# Patient Record
Sex: FEMALE | Race: WHITE
[De-identification: names, ages, dates, MRNs, and addresses within clinical notes are randomized per-mention and may not be internally consistent; named-entity substitution may affect disease eponyms.]

---

## 2018-01-14 ENCOUNTER — HOSPITAL ENCOUNTER (EMERGENCY)
Dept: HOSPITAL 17 - NEPC | Age: 83
Discharge: HOME | End: 2018-01-14
Payer: COMMERCIAL

## 2018-01-14 VITALS
HEART RATE: 86 BPM | OXYGEN SATURATION: 95 % | SYSTOLIC BLOOD PRESSURE: 102 MMHG | RESPIRATION RATE: 14 BRPM | DIASTOLIC BLOOD PRESSURE: 55 MMHG

## 2018-01-14 VITALS — OXYGEN SATURATION: 97 % | RESPIRATION RATE: 18 BRPM

## 2018-01-14 VITALS
SYSTOLIC BLOOD PRESSURE: 91 MMHG | DIASTOLIC BLOOD PRESSURE: 55 MMHG | HEART RATE: 103 BPM | OXYGEN SATURATION: 97 % | RESPIRATION RATE: 18 BRPM | TEMPERATURE: 98.6 F

## 2018-01-14 VITALS — BODY MASS INDEX: 23.01 KG/M2 | HEIGHT: 61.5 IN | WEIGHT: 123.46 LBS

## 2018-01-14 VITALS
HEART RATE: 88 BPM | RESPIRATION RATE: 18 BRPM | OXYGEN SATURATION: 94 % | DIASTOLIC BLOOD PRESSURE: 56 MMHG | SYSTOLIC BLOOD PRESSURE: 112 MMHG

## 2018-01-14 DIAGNOSIS — Z79.82: ICD-10-CM

## 2018-01-14 DIAGNOSIS — E78.00: ICD-10-CM

## 2018-01-14 DIAGNOSIS — T88.7XXA: Primary | ICD-10-CM

## 2018-01-14 DIAGNOSIS — R63.1: ICD-10-CM

## 2018-01-14 DIAGNOSIS — R53.1: ICD-10-CM

## 2018-01-14 DIAGNOSIS — E07.9: ICD-10-CM

## 2018-01-14 DIAGNOSIS — R94.31: ICD-10-CM

## 2018-01-14 DIAGNOSIS — Z87.19: ICD-10-CM

## 2018-01-14 DIAGNOSIS — K57.90: ICD-10-CM

## 2018-01-14 LAB
ALBUMIN SERPL-MCNC: 3.9 GM/DL (ref 3.4–5)
ALP SERPL-CCNC: 69 U/L (ref 45–117)
ALT SERPL-CCNC: 30 U/L (ref 10–53)
AST SERPL-CCNC: 30 U/L (ref 15–37)
BASOPHILS # BLD AUTO: 0 TH/MM3 (ref 0–0.2)
BASOPHILS NFR BLD: 0.2 % (ref 0–2)
BILIRUB SERPL-MCNC: 0.6 MG/DL (ref 0.2–1)
BUN SERPL-MCNC: 15 MG/DL (ref 7–18)
CALCIUM SERPL-MCNC: 8.7 MG/DL (ref 8.5–10.1)
CHLORIDE SERPL-SCNC: 105 MEQ/L (ref 98–107)
COLOR UR: (no result)
CREAT SERPL-MCNC: 0.82 MG/DL (ref 0.5–1)
EOSINOPHIL # BLD: 0 TH/MM3 (ref 0–0.4)
EOSINOPHIL NFR BLD: 0 % (ref 0–4)
ERYTHROCYTE [DISTWIDTH] IN BLOOD BY AUTOMATED COUNT: 13.3 % (ref 11.6–17.2)
GFR SERPLBLD BASED ON 1.73 SQ M-ARVRAT: 67 ML/MIN (ref 89–?)
GLUCOSE SERPL-MCNC: 139 MG/DL (ref 74–106)
GLUCOSE UR STRIP-MCNC: (no result) MG/DL
HCO3 BLD-SCNC: 25 MEQ/L (ref 21–32)
HCT VFR BLD CALC: 42.3 % (ref 35–46)
HGB BLD-MCNC: 14.5 GM/DL (ref 11.6–15.3)
HGB UR QL STRIP: (no result)
KETONES UR STRIP-MCNC: (no result) MG/DL
LIPASE: 144 U/L (ref 73–393)
LYMPHOCYTES # BLD AUTO: 0.2 TH/MM3 (ref 1–4.8)
LYMPHOCYTES NFR BLD AUTO: 1.3 % (ref 9–44)
MCH RBC QN AUTO: 31.3 PG (ref 27–34)
MCHC RBC AUTO-ENTMCNC: 34.2 % (ref 32–36)
MCV RBC AUTO: 91.6 FL (ref 80–100)
MONOCYTE #: 0.2 TH/MM3 (ref 0–0.9)
MONOCYTES NFR BLD: 1.7 % (ref 0–8)
NEUTROPHILS # BLD AUTO: 12.3 TH/MM3 (ref 1.8–7.7)
NEUTROPHILS NFR BLD AUTO: 96.8 % (ref 16–70)
NITRITE UR QL STRIP: (no result)
PLATELET # BLD: 202 TH/MM3 (ref 150–450)
PMV BLD AUTO: 7.1 FL (ref 7–11)
PROT SERPL-MCNC: 7.6 GM/DL (ref 6.4–8.2)
RBC # BLD AUTO: 4.61 MIL/MM3 (ref 4–5.3)
SODIUM SERPL-SCNC: 138 MEQ/L (ref 136–145)
SP GR UR STRIP: 1.02 (ref 1–1.03)
SQUAMOUS #/AREA URNS HPF: <1 /HPF (ref 0–5)
URINE LEUKOCYTE ESTERASE: (no result)
WBC # BLD AUTO: 12.7 TH/MM3 (ref 4–11)

## 2018-01-14 PROCEDURE — 96361 HYDRATE IV INFUSION ADD-ON: CPT

## 2018-01-14 PROCEDURE — 96360 HYDRATION IV INFUSION INIT: CPT

## 2018-01-14 PROCEDURE — 83690 ASSAY OF LIPASE: CPT

## 2018-01-14 PROCEDURE — 74177 CT ABD & PELVIS W/CONTRAST: CPT

## 2018-01-14 PROCEDURE — 85025 COMPLETE CBC W/AUTO DIFF WBC: CPT

## 2018-01-14 PROCEDURE — 80053 COMPREHEN METABOLIC PANEL: CPT

## 2018-01-14 PROCEDURE — 93005 ELECTROCARDIOGRAM TRACING: CPT

## 2018-01-14 PROCEDURE — 99285 EMERGENCY DEPT VISIT HI MDM: CPT

## 2018-01-14 PROCEDURE — 81001 URINALYSIS AUTO W/SCOPE: CPT

## 2018-01-14 NOTE — PD
HPI


Chief Complaint:  Medical Clearance


Time Seen by Provider:  11:24


Travel History


International Travel<30 days:  No


Contact w/Intl Traveler<30days:  No


Traveled to known affect area:  No





History of Present Illness


HPI


82-year-old female arrives with a history of tremor and polyuria.  Evidently 

she had episodes of diarrhea 2 days ago which was profuse.  She is recently 

started medication, Linzess.  The patient also complains of generalized fatigue 

and excessive sleepiness. Severity moderate. no modifying factor.





PFSH


Past Medical History


High Cholesterol:  Yes


Gastrointestinal Disorders:  Yes (ibs)


Thyroid Disease:  Yes


Tubal Ligation:  Yes





Past Surgical History


Other Surgery:  Yes (l elbow, l ankle)





Social History


Alcohol Use:  No


Tobacco Use:  No


Substance Use:  No





Allergies-Medications


(Allergen,Severity, Reaction):  


Coded Allergies:  


     No Known Allergies (Unverified , 1/14/18)


Reported Meds & Prescriptions





Reported Meds & Active Scripts


Active


Reported


Atorvastatin (Atorvastatin Calcium) 20 Mg Tab 20 Mg PO HS


Aspirin 325 Mg Tab 162 Mg PO DAILY


Linzess (Linaclotide) 290 Mcg Cap 290 Mcg PO DAILY








Review of Systems


Except as stated in HPI:  all other systems reviewed are Neg





Physical Exam


Narrative


GENERAL: Well-nourished well-developed 10-year-old female no acute distress


SKIN: Warm and dry.


HEAD: Atraumatic. Normocephalic. 


EYES: Pupils equal and round. No scleral icterus. No injection or drainage. 


ENT: No nasal bleeding or discharge.  Mucous membranes pink and moist.


NECK: Trachea midline. No JVD. 


CARDIOVASCULAR: Regular rate and rhythm.  


RESPIRATORY: No accessory muscle use. Clear to auscultation. Breath sounds 

equal bilaterally. 


GASTROINTESTINAL: Abdomen soft, non-tender, nondistended. Hepatic and splenic 

margins not palpable. 


MUSCULOSKELETAL: Extremities without clubbing, cyanosis, or edema. No obvious 

deformities. 


NEUROLOGICAL: Awake and alert. No obvious cranial nerve deficits.  Motor 

grossly within normal limits. Five out of 5 muscle strength in the arms and 

legs.  Normal speech.


PSYCHIATRIC: Appropriate mood and affect; insight and judgment normal.





Data


Data


Last Documented VS





Vital Signs








  Date Time  Temp Pulse Resp B/P (MAP) Pulse Ox O2 Delivery O2 Flow Rate FiO2


 


1/14/18 15:29  88 18 112/56 (74) 95 Room Air  


 


1/14/18 09:56 98.6       








Orders





 Orders


Electrocardiogram (1/14/18 10:25)


Complete Blood Count With Diff (1/14/18 11:31)


Comprehensive Metabolic Panel (1/14/18 11:31)


Lipase (1/14/18 11:31)


Urinalysis - C+S If Indicated (1/14/18 11:31)


Iv Access Insert/Monitor (1/14/18 11:31)


Ecg Monitoring (1/14/18 11:31)


Oximetry (1/14/18 11:31)


Sodium Chloride 0.9% Flush (Ns Flush) (1/14/18 11:45)


Ct Abd/Pel W Iv Contrast(Rout) (1/14/18 12:34)


Sodium Chlor 0.9% 1000 Ml Inj (Ns 1000 M (1/14/18 12:45)


Iohexol 350 Inj (Omnipaque 350 Inj) (1/14/18 13:45)


Ed Discharge Order (1/14/18 15:36)





Labs





Laboratory Tests








Test


  1/14/18


11:40 1/14/18


14:40


 


White Blood Count 12.7 TH/MM3  


 


Red Blood Count 4.61 MIL/MM3  


 


Hemoglobin 14.5 GM/DL  


 


Hematocrit 42.3 %  


 


Mean Corpuscular Volume 91.6 FL  


 


Mean Corpuscular Hemoglobin 31.3 PG  


 


Mean Corpuscular Hemoglobin


Concent 34.2 % 


  


 


 


Red Cell Distribution Width 13.3 %  


 


Platelet Count 202 TH/MM3  


 


Mean Platelet Volume 7.1 FL  


 


Neutrophils (%) (Auto) 96.8 %  


 


Lymphocytes (%) (Auto) 1.3 %  


 


Monocytes (%) (Auto) 1.7 %  


 


Eosinophils (%) (Auto) 0.0 %  


 


Basophils (%) (Auto) 0.2 %  


 


Neutrophils # (Auto) 12.3 TH/MM3  


 


Lymphocytes # (Auto) 0.2 TH/MM3  


 


Monocytes # (Auto) 0.2 TH/MM3  


 


Eosinophils # (Auto) 0.0 TH/MM3  


 


Basophils # (Auto) 0.0 TH/MM3  


 


CBC Comment DIFF FINAL  


 


Differential Comment   


 


Blood Urea Nitrogen 15 MG/DL  


 


Creatinine 0.82 MG/DL  


 


Random Glucose 139 MG/DL  


 


Total Protein 7.6 GM/DL  


 


Albumin 3.9 GM/DL  


 


Calcium Level 8.7 MG/DL  


 


Alkaline Phosphatase 69 U/L  


 


Aspartate Amino Transf


(AST/SGOT) 30 U/L 


  


 


 


Alanine Aminotransferase


(ALT/SGPT) 30 U/L 


  


 


 


Total Bilirubin 0.6 MG/DL  


 


Sodium Level 138 MEQ/L  


 


Potassium Level 4.0 MEQ/L  


 


Chloride Level 105 MEQ/L  


 


Carbon Dioxide Level 25.0 MEQ/L  


 


Anion Gap 8 MEQ/L  


 


Estimat Glomerular Filtration


Rate 67 ML/MIN 


  


 


 


Lipase 144 U/L  


 


Urine Color  LIGHT-YELLOW 


 


Urine Turbidity  CLEAR 


 


Urine pH  6.0 


 


Urine Specific Gravity  1.021 


 


Urine Protein  NEG mg/dL 


 


Urine Glucose (UA)  NEG mg/dL 


 


Urine Ketones  NEG mg/dL 


 


Urine Occult Blood  NEG 


 


Urine Nitrite  NEG 


 


Urine Bilirubin  NEG 


 


Urine Urobilinogen


  


  LESS THAN 2.0


MG/DL


 


Urine Leukocyte Esterase  SMALL 


 


Urine RBC


  


  LESS THAN 1


/hpf


 


Urine WBC  2 /hpf 


 


Urine Squamous Epithelial


Cells 


  <1 /hpf 


 


 


Microscopic Urinalysis Comment


  


  CULT NOT


INDICATED











MDM


Medical Decision Making


Medical Screen Exam Complete:  Yes


Emergency Medical Condition:  Yes


Medical Record Reviewed:  Yes


Differential Diagnosis


Renal failure, anemia, medication side effect


Narrative Course


CBC & BMP Diagram


1/14/18 11:40








Total Protein 7.6, Albumin 3.9, Calcium Level 8.7, Alkaline Phosphatase 69, 

Aspartate Amino Transf (AST/SGOT) 30, Alanine Aminotransferase (ALT/SGPT) 30, 

Total Bilirubin 0.6





Last Impressions








Abdomen/Pelvis CT 1/14/18 1234 Signed





Impressions: 





 Service Date/Time:  Sunday, January 14, 2018 13:23 - CONCLUSION:  1. Mildly 





 nonspecific, nonobstructive bowel gas pattern which may represent a mild ileus 





 and/or gastroenteritis. 2. Mild diverticulosis with no definite focal 





 inflammatory change. There is normal appendix. 3. The gallbladder is 





 unremarkable in appearance.     Ryan Dorantes MD 








The patient is resting comfortably and feels better, is alert and in no 

distress. The patients results and examination findings were discussed.  The 

repeat examination is unremarkable and benign. The history, exam, diagnostic 

testing, and current condition do not suggest any significant pathology to 

warrant further testing, continued ED treatment, admission, or surgical 

evaluation at this point. The vital signs have been stable. The patient does 

not have uncontrollable pain, intractable vomiting, or other significant 

symptoms. The patient's condition is stable and appropriate for discharge. The 

patient will pursue further outpatient evaluation with a primary care physician 

or other designated or consulting physician as indicated in the discharge 

instructions. The patient expressed understanding and was agreeable with this 

plan.





Diagnosis





 Primary Impression:  


 Medication side effect


 Qualified Codes:  T88.7XXA - Unspecified adverse effect of drug or medicament, 

initial encounter


 Additional Impressions:  


 Diarrhea


 Qualified Codes:  R19.7 - Diarrhea, unspecified


 Weakness


 Polydipsia


***Med/Other Pt SpecificInfo:  No Change to Meds


Disposition:  01 DISCHARGE HOME


Condition:  Stable











Zach Ruelas MD Jan 14, 2018 15:40

## 2018-01-14 NOTE — RADRPT
EXAM DATE/TIME:  01/14/2018 13:23 

 

[facility] COMPARISON:     

No previous studies available for comparison.

 

 

INDICATIONS :     

Epigastric abdominal pain today.

                      

 

IV CONTRAST:     

75 cc Omnipaque 350 (iohexol) IV 

 

 

ORAL CONTRAST:      

No oral contrast ingested.

                      

 

RADIATION DOSE:     

20.23 CTDIvol (mGy) 

 

 

MEDICAL HISTORY :       

Thyroid disease.

 

SURGICAL HISTORY :      

Tubal ligation. 

 

ENCOUNTER:      

Initial

 

ACUITY:      

3 days

 

PAIN SCALE:      

5/10

 

LOCATION:          

epigastric abdominal

 

TECHNIQUE:     

Volumetric scanning of the abdomen and pelvis was performed.  Using automated exposure control and ad
justment of the mA and/or kV according to patient size, radiation dose was kept as low as reasonably 
achievable to obtain optimal diagnostic quality images.  DICOM format image data is available electro
nically for review and comparison.  

 

FINDINGS:     

 

LOWER LUNGS:     

There is mild scarring and/or atelectasis in both lung bases.

 

LIVER:     

Homogeneous density without lesion.  There is no dilation of the biliary tree.  No calcified gallston
es.

 

SPLEEN:     

Normal size without lesion.

 

PANCREAS:     

Within normal limits.

 

KIDNEYS:     

Normal in size and shape.  There is no mass, stone or hydronephrosis.

 

ADRENAL GLANDS:     

Within normal limits.

 

VASCULAR:     

There is no aortic aneurysm.

 

BOWEL/MESENTERY:     

There are multiple loops of nondilated air-containing small bowel with multiple small air-fluid level
s. There are scattered diverticuli greatest in the sigmoid colon. There is a normal appendix. There i
s no free air or fluid. No oral contrast was given limiting the sensitivity the exam.

 

ABDOMINAL WALL:     

Within normal limits.

 

RETROPERITONEUM:     

There is no lymphadenopathy. 

 

BLADDER:     

No wall thickening or mass. 

 

REPRODUCTIVE:     

Within normal limits.

 

INGUINAL:     

There is no lymphadenopathy or hernia. 

 

MUSCULOSKELETAL:     

Within normal limits for patient age. 

 

CONCLUSION:     

1. Mildly nonspecific, nonobstructive bowel gas pattern which may represent a mild ileus and/or gastr
oenteritis.

2. Mild diverticulosis with no definite focal inflammatory change. There is normal appendix.

3. The gallbladder is unremarkable in appearance.

 

 

 

 Ryan Dorantes MD on January 14, 2018 at 13:57           

Board Certified Radiologist.

 This report was verified electronically.

## 2018-01-14 NOTE — PD
Physical Exam


Time Seen by Provider:  10:21


Narrative


83yo F presents with her neighbor.  He tried calling her morning 4 times, and 

when she finally answered she told him she was sick.  Hx of IBS and had a bout 

on Friday with a lot of diarrhea.  The patient herself has no complaints, but 

the neighbor states she's been shaky and drinking a lot of water and thinks she 

may be dehydrated.  Denies fever, vomiting.  BSG in triage 135.





Patient seen in triage.  Awaiting bed placement.  See next providers note for 

final patient disposition.





Data


Data


Last Documented VS





Vital Signs








  Date Time  Temp Pulse Resp B/P (MAP) Pulse Ox O2 Delivery O2 Flow Rate FiO2


 


1/14/18 09:56 98.6 103 18 91/55 (67) 97   











MDM


Supervised Visit with SUHAIL:  Ember Del Castillo Jan 14, 2018 10:23

## 2018-01-15 NOTE — EKG
Date Performed: 01/14/2018       Time Performed: 10:30:00

 

PTAGE:      82 years

 

EKG:      Sinus rhythm 

 

 NONSPECIFIC ST & T-WAVE ABNORMALITY BORDERLINE ECG 

 

NO PREVIOUS TRACING            

 

DOCTOR:   Ranjit Espinoza  Interpretating Date/Time  01/15/2018 15:38:27